# Patient Record
Sex: MALE | Race: WHITE | HISPANIC OR LATINO | Employment: FULL TIME | URBAN - METROPOLITAN AREA
[De-identification: names, ages, dates, MRNs, and addresses within clinical notes are randomized per-mention and may not be internally consistent; named-entity substitution may affect disease eponyms.]

---

## 2022-04-13 ENCOUNTER — OFFICE VISIT (OUTPATIENT)
Dept: URGENT CARE | Facility: URGENT CARE | Age: 37
End: 2022-04-13

## 2022-04-13 VITALS
SYSTOLIC BLOOD PRESSURE: 128 MMHG | DIASTOLIC BLOOD PRESSURE: 78 MMHG | RESPIRATION RATE: 20 BRPM | TEMPERATURE: 98 F | HEART RATE: 78 BPM | OXYGEN SATURATION: 98 %

## 2022-04-13 DIAGNOSIS — K64.9 HEMORRHOIDS, UNSPECIFIED HEMORRHOID TYPE: Primary | ICD-10-CM

## 2022-04-13 PROCEDURE — 99203 OFFICE O/P NEW LOW 30 MIN: CPT | Performed by: FAMILY MEDICINE

## 2022-04-13 RX ORDER — HYDROCORTISONE 25 MG/G
CREAM TOPICAL 2 TIMES DAILY PRN
Qty: 28 G | Refills: 0 | Status: SHIPPED | OUTPATIENT
Start: 2022-04-13

## 2022-04-13 ASSESSMENT — PAIN SCALES - GENERAL: PAINLEVEL: SEVERE PAIN (6)

## 2022-04-13 NOTE — PROGRESS NOTES
SUBJECTIVE:  Chief Complaint   Patient presents with     Rectal Problem     Poss hemorrhoids      New patient to [place]    Raymundo Reyes Tlaixco is a 36 year old male who presents with a chief complaint of possible hemorrhoids.     Feels bump on outside of rectal area and is painful.  Symptoms for the past 3 days.  No bleeding, no constipation    Has tried OTC hemorroid but did not help    History reviewed. No pertinent past medical history.  Current Outpatient Medications   Medication Sig Dispense Refill     hydrocortisone, Perianal, (HYDROCORTISONE) 2.5 % cream Place rectally 2 times daily as needed for hemorrhoids 28 g 0     Social History     Tobacco Use     Smoking status: Current Every Day Smoker     Types: Cigarettes     Smokeless tobacco: Current User   Substance Use Topics     Alcohol use: Not on file       ROS:  Review of systems negative except as stated above.    EXAM:   /78   Pulse 78   Temp 98  F (36.7  C)   Resp 20   SpO2 98%   GENERAL APPEARANCE: healthy, alert and no distress  RECTAL: 9 o'clock with swelling and tenderness, pink, not thrombosed  PSYCH: alert, affect bright      ASSESSMENT/PLAN:  (K64.9) Hemorrhoids, unspecified hemorrhoid type  (primary encounter diagnosis)  Plan: hydrocortisone, Perianal, (HYDROCORTISONE) 2.5         % cream            Reassurance given, reviewed that symptoms presentation is consistent with hemorrhoids.  Discussed internal and external hemorrhoids, importance of keeping stools soft.  RX Anusol 2.5% cream given to apply to area, encourage tylenol and ibuprofen for discomfort    Follow up with primary provider if no improvement of symptoms in 1-2 weeks    Michael Mas MD  April 13, 2022 6:12 PM

## 2022-04-13 NOTE — PATIENT INSTRUCTIONS
Okay to take ibuprofen 200 mg - 4 tablets (800 mg) every 8 hours as needed.  Okay to take tylenol 500 mg - 2 tablets (1000 mg) every 6-8 hours as needed, do not exceed 3000 mg in 24 hours.